# Patient Record
Sex: FEMALE | Race: WHITE | NOT HISPANIC OR LATINO | URBAN - METROPOLITAN AREA
[De-identification: names, ages, dates, MRNs, and addresses within clinical notes are randomized per-mention and may not be internally consistent; named-entity substitution may affect disease eponyms.]

---

## 2017-08-19 ENCOUNTER — EMERGENCY (EMERGENCY)
Facility: HOSPITAL | Age: 62
LOS: 1 days | Discharge: HOME | End: 2017-08-19
Attending: EMERGENCY MEDICINE
Payer: COMMERCIAL

## 2017-08-19 VITALS
OXYGEN SATURATION: 99 % | WEIGHT: 153 LBS | RESPIRATION RATE: 18 BRPM | HEIGHT: 60 IN | TEMPERATURE: 98 F | SYSTOLIC BLOOD PRESSURE: 147 MMHG | HEART RATE: 85 BPM | DIASTOLIC BLOOD PRESSURE: 82 MMHG

## 2017-08-19 PROCEDURE — 23650 CLTX SHO DSLC W/MNPJ WO ANES: CPT | Mod: 54

## 2017-08-19 PROCEDURE — 99283 EMERGENCY DEPT VISIT LOW MDM: CPT | Mod: 25

## 2017-08-19 PROCEDURE — 73030 X-RAY EXAM OF SHOULDER: CPT | Mod: 26,RT

## 2017-08-19 PROCEDURE — 23650 CLTX SHO DSLC W/MNPJ WO ANES: CPT | Mod: RT

## 2017-08-19 PROCEDURE — 73030 X-RAY EXAM OF SHOULDER: CPT

## 2017-08-19 RX ORDER — IBUPROFEN 200 MG
1 TABLET ORAL
Qty: 15 | Refills: 0 | OUTPATIENT
Start: 2017-08-19 | End: 2017-08-24

## 2017-08-19 RX ORDER — OXYCODONE AND ACETAMINOPHEN 5; 325 MG/1; MG/1
1 TABLET ORAL ONCE
Qty: 0 | Refills: 0 | Status: DISCONTINUED | OUTPATIENT
Start: 2017-08-19 | End: 2017-08-19

## 2017-08-19 RX ADMIN — OXYCODONE AND ACETAMINOPHEN 1 TABLET(S): 5; 325 TABLET ORAL at 19:56

## 2017-08-19 NOTE — ED ADULT TRIAGE NOTE - CHIEF COMPLAINT QUOTE
States fell off step stool and may have broke right shoulder. +2 radial pulses present. No pallor or pain to hand/fingers.

## 2017-08-19 NOTE — ED STATDOCS - CARE PLAN
Principal Discharge DX:	Shoulder dislocation, right, initial encounter  Secondary Diagnosis:	Fracture of humeral head

## 2017-08-19 NOTE — ED STATDOCS - NEUROLOGICAL, MLM
sensation is normal and strength is normal. sensation is normal and strength is normal. Neurovascularly intact.

## 2017-08-19 NOTE — ED ADULT NURSE NOTE - OBJECTIVE STATEMENT
Pt c/o r shoulder immobilization and pain that travels down the r arm causing a tingling sensation. Radial pulse is palpable. Pt states she fell backwards off a stool and her arm got caught behind her when she hit the ground. Pt denies hitting her head, no obvious deformity is noted. Obvious ROM is limited to the right upper extremity.

## 2017-08-19 NOTE — ED STATDOCS - PROGRESS NOTE DETAILS
NP NOTE:  61 y/o F slipped and fell off of a stool onto the floor.  She denies hitting her head or LOC.  Right shoulder deformed, x-ray with dislocation, reduced using scapular manipulation, sling applied.  post reduction x-ray in good position, + humeral head fx.  Will d/c home with rx ibuprofen and f/u ortho.

## 2017-08-19 NOTE — ED STATDOCS - ATTENDING CONTRIBUTION TO CARE
I, Kayla Lea, performed the initial face to face bedside interview with this patient regarding history of present illness, review of symptoms and relevant past medical, social and family history.  I completed an independent physical examination.  I was the initial provider who evaluated this patient. I have signed out the follow up of any pending tests (i.e. labs, radiological studies) to the ACP.  I have communicated the patient’s plan of care and disposition with the ACP.

## 2017-08-22 PROBLEM — Z00.00 ENCOUNTER FOR PREVENTIVE HEALTH EXAMINATION: Status: ACTIVE | Noted: 2017-08-22

## 2017-08-24 ENCOUNTER — APPOINTMENT (OUTPATIENT)
Dept: ORTHOPEDIC SURGERY | Facility: CLINIC | Age: 62
End: 2017-08-24
Payer: COMMERCIAL

## 2017-08-24 ENCOUNTER — OTHER (OUTPATIENT)
Age: 62
End: 2017-08-24

## 2017-08-24 VITALS
DIASTOLIC BLOOD PRESSURE: 84 MMHG | SYSTOLIC BLOOD PRESSURE: 148 MMHG | HEIGHT: 60 IN | BODY MASS INDEX: 29.45 KG/M2 | WEIGHT: 150 LBS | HEART RATE: 61 BPM

## 2017-08-24 DIAGNOSIS — Z87.891 PERSONAL HISTORY OF NICOTINE DEPENDENCE: ICD-10-CM

## 2017-08-24 DIAGNOSIS — Z86.79 PERSONAL HISTORY OF OTHER DISEASES OF THE CIRCULATORY SYSTEM: ICD-10-CM

## 2017-08-24 DIAGNOSIS — Z78.9 OTHER SPECIFIED HEALTH STATUS: ICD-10-CM

## 2017-08-24 DIAGNOSIS — Z87.39 PERSONAL HISTORY OF OTHER DISEASES OF THE MUSCULOSKELETAL SYSTEM AND CONNECTIVE TISSUE: ICD-10-CM

## 2017-08-24 DIAGNOSIS — Z86.39 PERSONAL HISTORY OF OTHER ENDOCRINE, NUTRITIONAL AND METABOLIC DISEASE: ICD-10-CM

## 2017-08-24 DIAGNOSIS — S42.291A OTHER DISPLACED FRACTURE OF UPPER END OF RIGHT HUMERUS, INITIAL ENCOUNTER FOR CLOSED FRACTURE: ICD-10-CM

## 2017-08-24 PROCEDURE — 99203 OFFICE O/P NEW LOW 30 MIN: CPT

## 2017-08-24 RX ORDER — TRAMADOL HYDROCHLORIDE 50 MG/1
50 TABLET, COATED ORAL
Qty: 30 | Refills: 0 | Status: ACTIVE | COMMUNITY
Start: 2017-08-24 | End: 1900-01-01

## 2017-08-24 RX ORDER — HYDROCHLOROTHIAZIDE 25 MG/1
25 TABLET ORAL
Qty: 90 | Refills: 0 | Status: ACTIVE | COMMUNITY
Start: 2017-06-21

## 2017-08-24 RX ORDER — ROSUVASTATIN CALCIUM 5 MG/1
5 TABLET, FILM COATED ORAL
Qty: 90 | Refills: 0 | Status: ACTIVE | COMMUNITY
Start: 2017-03-13

## 2017-08-24 RX ORDER — IBUPROFEN 600 MG/1
600 TABLET, FILM COATED ORAL
Qty: 15 | Refills: 0 | Status: ACTIVE | COMMUNITY
Start: 2017-08-20

## 2017-08-24 RX ORDER — LEVOTHYROXINE SODIUM 0.05 MG/1
50 TABLET ORAL
Qty: 90 | Refills: 0 | Status: ACTIVE | COMMUNITY
Start: 2017-06-21

## 2017-08-24 RX ORDER — QUINAPRIL HYDROCHLORIDE 20 MG/1
20 TABLET, FILM COATED ORAL
Qty: 90 | Refills: 0 | Status: ACTIVE | COMMUNITY
Start: 2017-08-11

## 2017-08-24 RX ORDER — ROSUVASTATIN CALCIUM 5 MG/1
5 TABLET, FILM COATED ORAL
Refills: 0 | Status: ACTIVE | COMMUNITY

## 2017-08-25 ENCOUNTER — OTHER (OUTPATIENT)
Age: 62
End: 2017-08-25

## 2017-08-26 ENCOUNTER — TRANSCRIPTION ENCOUNTER (OUTPATIENT)
Age: 62
End: 2017-08-26

## 2017-09-13 ENCOUNTER — APPOINTMENT (OUTPATIENT)
Dept: ORTHOPEDIC SURGERY | Facility: CLINIC | Age: 62
End: 2017-09-13
Payer: COMMERCIAL

## 2017-09-13 VITALS
HEIGHT: 60 IN | DIASTOLIC BLOOD PRESSURE: 83 MMHG | HEART RATE: 70 BPM | BODY MASS INDEX: 29.45 KG/M2 | SYSTOLIC BLOOD PRESSURE: 133 MMHG | WEIGHT: 150 LBS

## 2017-09-13 PROCEDURE — 73030 X-RAY EXAM OF SHOULDER: CPT | Mod: RT

## 2017-09-13 PROCEDURE — 99214 OFFICE O/P EST MOD 30 MIN: CPT

## 2017-09-13 RX ORDER — CYCLOBENZAPRINE HYDROCHLORIDE 10 MG/1
10 TABLET, FILM COATED ORAL
Qty: 60 | Refills: 2 | Status: ACTIVE | COMMUNITY
Start: 2017-09-13 | End: 1900-01-01

## 2017-10-17 ENCOUNTER — APPOINTMENT (OUTPATIENT)
Dept: ORTHOPEDIC SURGERY | Facility: CLINIC | Age: 62
End: 2017-10-17
Payer: COMMERCIAL

## 2017-10-17 VITALS
HEART RATE: 89 BPM | BODY MASS INDEX: 29.45 KG/M2 | WEIGHT: 150 LBS | DIASTOLIC BLOOD PRESSURE: 78 MMHG | HEIGHT: 60 IN | SYSTOLIC BLOOD PRESSURE: 124 MMHG

## 2017-10-17 PROCEDURE — 73030 X-RAY EXAM OF SHOULDER: CPT | Mod: RT

## 2017-10-17 PROCEDURE — 23600 CLTX PROX HUMRL FX W/O MNPJ: CPT | Mod: RT

## 2017-10-17 PROCEDURE — 99214 OFFICE O/P EST MOD 30 MIN: CPT | Mod: 57

## 2017-11-22 ENCOUNTER — OTHER (OUTPATIENT)
Age: 62
End: 2017-11-22

## 2017-11-24 ENCOUNTER — APPOINTMENT (OUTPATIENT)
Dept: ORTHOPEDIC SURGERY | Facility: CLINIC | Age: 62
End: 2017-11-24
Payer: COMMERCIAL

## 2017-11-24 VITALS
HEART RATE: 75 BPM | WEIGHT: 150 LBS | HEIGHT: 60 IN | SYSTOLIC BLOOD PRESSURE: 115 MMHG | BODY MASS INDEX: 29.45 KG/M2 | DIASTOLIC BLOOD PRESSURE: 77 MMHG | TEMPERATURE: 98.2 F

## 2017-11-24 DIAGNOSIS — M75.81 OTHER SHOULDER LESIONS, RIGHT SHOULDER: ICD-10-CM

## 2017-11-24 DIAGNOSIS — S42.291D OTHER DISPLACED FRACTURE OF UPPER END OF RIGHT HUMERUS, SUBSEQUENT ENCOUNTER FOR FRACTURE WITH ROUTINE HEALING: ICD-10-CM

## 2017-11-24 PROCEDURE — 99024 POSTOP FOLLOW-UP VISIT: CPT

## 2017-11-24 PROCEDURE — 73030 X-RAY EXAM OF SHOULDER: CPT | Mod: RT

## 2017-12-13 ENCOUNTER — OTHER (OUTPATIENT)
Age: 62
End: 2017-12-13

## 2017-12-13 DIAGNOSIS — M75.121 COMPLETE ROTATOR CUFF TEAR OR RUPTURE OF RIGHT SHOULDER, NOT SPECIFIED AS TRAUMATIC: ICD-10-CM

## 2017-12-28 ENCOUNTER — OTHER (OUTPATIENT)
Age: 62
End: 2017-12-28

## 2018-01-08 ENCOUNTER — APPOINTMENT (OUTPATIENT)
Dept: ORTHOPEDIC SURGERY | Facility: CLINIC | Age: 63
End: 2018-01-08
Payer: COMMERCIAL

## 2018-01-08 VITALS
BODY MASS INDEX: 29.45 KG/M2 | DIASTOLIC BLOOD PRESSURE: 85 MMHG | WEIGHT: 150 LBS | HEIGHT: 60 IN | HEART RATE: 69 BPM | SYSTOLIC BLOOD PRESSURE: 145 MMHG

## 2018-01-08 PROCEDURE — 99024 POSTOP FOLLOW-UP VISIT: CPT

## 2018-02-23 ENCOUNTER — OUTPATIENT (OUTPATIENT)
Dept: OUTPATIENT SERVICES | Facility: HOSPITAL | Age: 63
LOS: 1 days | End: 2018-02-23
Payer: COMMERCIAL

## 2018-02-23 VITALS
RESPIRATION RATE: 16 BRPM | OXYGEN SATURATION: 99 % | WEIGHT: 156.09 LBS | HEART RATE: 62 BPM | SYSTOLIC BLOOD PRESSURE: 136 MMHG | TEMPERATURE: 98 F | HEIGHT: 58.5 IN | DIASTOLIC BLOOD PRESSURE: 78 MMHG

## 2018-02-23 DIAGNOSIS — S46.811A STRAIN OF OTHER MUSCLES, FASCIA AND TENDONS AT SHOULDER AND UPPER ARM LEVEL, RIGHT ARM, INITIAL ENCOUNTER: ICD-10-CM

## 2018-02-23 DIAGNOSIS — Z98.891 HISTORY OF UTERINE SCAR FROM PREVIOUS SURGERY: Chronic | ICD-10-CM

## 2018-02-23 DIAGNOSIS — E03.9 HYPOTHYROIDISM, UNSPECIFIED: ICD-10-CM

## 2018-02-23 DIAGNOSIS — I10 ESSENTIAL (PRIMARY) HYPERTENSION: ICD-10-CM

## 2018-02-23 LAB
BUN SERPL-MCNC: 29 MG/DL — HIGH (ref 7–23)
CALCIUM SERPL-MCNC: 9.2 MG/DL — SIGNIFICANT CHANGE UP (ref 8.4–10.5)
CHLORIDE SERPL-SCNC: 98 MMOL/L — SIGNIFICANT CHANGE UP (ref 98–107)
CO2 SERPL-SCNC: 27 MMOL/L — SIGNIFICANT CHANGE UP (ref 22–31)
CREAT SERPL-MCNC: 1.22 MG/DL — SIGNIFICANT CHANGE UP (ref 0.5–1.3)
GLUCOSE SERPL-MCNC: 88 MG/DL — SIGNIFICANT CHANGE UP (ref 70–99)
HCT VFR BLD CALC: 39.5 % — SIGNIFICANT CHANGE UP (ref 34.5–45)
HGB BLD-MCNC: 12.8 G/DL — SIGNIFICANT CHANGE UP (ref 11.5–15.5)
MCHC RBC-ENTMCNC: 29.4 PG — SIGNIFICANT CHANGE UP (ref 27–34)
MCHC RBC-ENTMCNC: 32.4 % — SIGNIFICANT CHANGE UP (ref 32–36)
MCV RBC AUTO: 90.8 FL — SIGNIFICANT CHANGE UP (ref 80–100)
NRBC # FLD: 0 — SIGNIFICANT CHANGE UP
PLATELET # BLD AUTO: 234 K/UL — SIGNIFICANT CHANGE UP (ref 150–400)
PMV BLD: 11.8 FL — SIGNIFICANT CHANGE UP (ref 7–13)
POTASSIUM SERPL-MCNC: 4 MMOL/L — SIGNIFICANT CHANGE UP (ref 3.5–5.3)
POTASSIUM SERPL-SCNC: 4 MMOL/L — SIGNIFICANT CHANGE UP (ref 3.5–5.3)
RBC # BLD: 4.35 M/UL — SIGNIFICANT CHANGE UP (ref 3.8–5.2)
RBC # FLD: 12.2 % — SIGNIFICANT CHANGE UP (ref 10.3–14.5)
SODIUM SERPL-SCNC: 140 MMOL/L — SIGNIFICANT CHANGE UP (ref 135–145)
WBC # BLD: 8.82 K/UL — SIGNIFICANT CHANGE UP (ref 3.8–10.5)
WBC # FLD AUTO: 8.82 K/UL — SIGNIFICANT CHANGE UP (ref 3.8–10.5)

## 2018-02-23 PROCEDURE — 93010 ELECTROCARDIOGRAM REPORT: CPT

## 2018-02-23 RX ORDER — LEVOTHYROXINE SODIUM 125 MCG
1 TABLET ORAL
Qty: 0 | Refills: 0 | COMMUNITY

## 2018-02-23 RX ORDER — CHOLECALCIFEROL (VITAMIN D3) 125 MCG
1 CAPSULE ORAL
Qty: 0 | Refills: 0 | COMMUNITY

## 2018-02-23 RX ORDER — ASPIRIN/CALCIUM CARB/MAGNESIUM 324 MG
1 TABLET ORAL
Qty: 0 | Refills: 0 | COMMUNITY

## 2018-02-23 RX ORDER — ROSUVASTATIN CALCIUM 5 MG/1
5 TABLET ORAL
Qty: 0 | Refills: 0 | COMMUNITY

## 2018-02-23 RX ORDER — QUINAPRIL HYDROCHLORIDE 40 MG/1
1 TABLET, FILM COATED ORAL
Qty: 0 | Refills: 0 | COMMUNITY

## 2018-02-23 NOTE — H&P PST ADULT - LYMPHATIC
anterior cervical R/anterior cervical L/posterior cervical L/supraclavicular L/supraclavicular R/posterior cervical R

## 2018-02-23 NOTE — H&P PST ADULT - GASTROINTESTINAL DETAILS
no rebound tenderness/no rigidity/no organomegaly/no guarding/no distention/bowel sounds normal/no masses palpable/soft/no bruit/nontender

## 2018-02-23 NOTE — H&P PST ADULT - RS GEN PE MLT RESP DETAILS PC
breath sounds equal/no wheezes/clear to auscultation bilaterally/respirations non-labored/no rhonchi/no rales

## 2018-02-23 NOTE — H&P PST ADULT - HISTORY OF PRESENT ILLNESS
62 yr old female with medical hx htn, hyperlipidemia, hypothyroidism presents for preop evaluation with c/o right shoulder pain after sustaining a fall August 2017.  Pt reports she had rehab but continued to have pain and then did a "MRI which showed a tear". 62 yr old female with medical hx htn, hyperlipidemia, hypothyroidism presents for preop evaluation with c/o right shoulder pain after sustaining a fall August 2017.  Pt reports she had rehab but continued to have pain and then did a "MRI which showed a tear".   Pt was dx with Strain of other Muscle, fascia and tendons at shoulder and upper arm level and is now scheduled for Right Shoulder Rotator Cuff Repair Decompression on 03/06/18.

## 2018-02-23 NOTE — H&P PST ADULT - FAMILY HISTORY
Mother  Still living? No  Family history of Alzheimer's disease, Age at diagnosis: Age Unknown     Sibling  Still living? Yes, Estimated age: Age Unknown  Family history of breast cancer in sister, Age at diagnosis: Age Unknown

## 2018-02-23 NOTE — H&P PST ADULT - PROBLEM SELECTOR PLAN 1
Scheduled for Right Shoulder Rotator Cuff Repair Decompression on 03/06/18.  Labs and ekg done results pending.  Preop, famotidine and chlorhexidine instructions provided and questions addressed.

## 2018-03-06 ENCOUNTER — TRANSCRIPTION ENCOUNTER (OUTPATIENT)
Age: 63
End: 2018-03-06

## 2018-03-06 ENCOUNTER — APPOINTMENT (OUTPATIENT)
Dept: ORTHOPEDIC SURGERY | Facility: AMBULATORY SURGERY CENTER | Age: 63
End: 2018-03-06

## 2018-03-06 ENCOUNTER — OUTPATIENT (OUTPATIENT)
Dept: OUTPATIENT SERVICES | Facility: HOSPITAL | Age: 63
LOS: 1 days | Discharge: HOME | End: 2018-03-06
Payer: COMMERCIAL

## 2018-03-06 VITALS
RESPIRATION RATE: 18 BRPM | DIASTOLIC BLOOD PRESSURE: 64 MMHG | OXYGEN SATURATION: 97 % | SYSTOLIC BLOOD PRESSURE: 125 MMHG | HEART RATE: 93 BPM | TEMPERATURE: 98 F

## 2018-03-06 VITALS
WEIGHT: 156.09 LBS | RESPIRATION RATE: 16 BRPM | OXYGEN SATURATION: 99 % | DIASTOLIC BLOOD PRESSURE: 82 MMHG | TEMPERATURE: 98 F | HEART RATE: 67 BPM | SYSTOLIC BLOOD PRESSURE: 149 MMHG | HEIGHT: 58.5 IN

## 2018-03-06 DIAGNOSIS — Z98.891 HISTORY OF UTERINE SCAR FROM PREVIOUS SURGERY: Chronic | ICD-10-CM

## 2018-03-06 DIAGNOSIS — S46.811A STRAIN OF OTHER MUSCLES, FASCIA AND TENDONS AT SHOULDER AND UPPER ARM LEVEL, RIGHT ARM, INITIAL ENCOUNTER: ICD-10-CM

## 2018-03-06 PROCEDURE — 29827 SHO ARTHRS SRG RT8TR CUF RPR: CPT | Mod: RT

## 2018-03-06 PROCEDURE — 29823 SHO ARTHRS SRG XTNSV DBRDMT: CPT | Mod: RT

## 2018-03-06 PROCEDURE — 29826 SHO ARTHRS SRG DECOMPRESSION: CPT | Mod: RT

## 2018-03-06 NOTE — ASU DISCHARGE PLAN (ADULT/PEDIATRIC). - SPECIAL INSTRUCTIONS
Take stool softener as needed for possible constipation from pain meds     Don't take Tylenol with percocet as there is Tylenol in percocet     Ice 15-20 minutes every 3-4 hrs for next 3 days

## 2018-03-06 NOTE — ASU DISCHARGE PLAN (ADULT/PEDIATRIC). - NOTIFY
Fever greater than 101/Inability to Tolerate Liquids or Foods/Bleeding that does not stop/Swelling that continues/Pain not relieved by Medications/Unable to Urinate/Numbness, tingling/Numbness, color, or temperature change to extremity/Persistent Nausea and Vomiting

## 2018-03-19 ENCOUNTER — APPOINTMENT (OUTPATIENT)
Dept: ORTHOPEDIC SURGERY | Facility: CLINIC | Age: 63
End: 2018-03-19
Payer: COMMERCIAL

## 2018-03-19 VITALS — BODY MASS INDEX: 29.45 KG/M2 | WEIGHT: 150 LBS | HEIGHT: 60 IN

## 2018-03-19 PROCEDURE — 99024 POSTOP FOLLOW-UP VISIT: CPT

## 2018-04-09 ENCOUNTER — APPOINTMENT (OUTPATIENT)
Dept: ORTHOPEDIC SURGERY | Facility: CLINIC | Age: 63
End: 2018-04-09
Payer: COMMERCIAL

## 2018-04-09 DIAGNOSIS — S46.811D STRAIN OF OTHER MUSCLES, FASCIA AND TENDONS AT SHOULDER AND UPPER ARM LEVEL, RIGHT ARM, SUBSEQUENT ENCOUNTER: ICD-10-CM

## 2018-04-09 PROCEDURE — 99024 POSTOP FOLLOW-UP VISIT: CPT

## 2018-05-21 ENCOUNTER — APPOINTMENT (OUTPATIENT)
Dept: ORTHOPEDIC SURGERY | Facility: CLINIC | Age: 63
End: 2018-05-21
Payer: COMMERCIAL

## 2018-05-21 DIAGNOSIS — S46.219A STRAIN OF MUSCLE, FASCIA AND TENDON OF OTHER PARTS OF BICEPS, UNSPECIFIED ARM, INITIAL ENCOUNTER: ICD-10-CM

## 2018-05-21 PROCEDURE — 99024 POSTOP FOLLOW-UP VISIT: CPT

## 2018-07-02 ENCOUNTER — APPOINTMENT (OUTPATIENT)
Dept: ORTHOPEDIC SURGERY | Facility: CLINIC | Age: 63
End: 2018-07-02
Payer: COMMERCIAL

## 2018-07-02 PROCEDURE — 99213 OFFICE O/P EST LOW 20 MIN: CPT

## 2018-09-07 ENCOUNTER — OTHER (OUTPATIENT)
Age: 63
End: 2018-09-07

## 2018-09-24 ENCOUNTER — APPOINTMENT (OUTPATIENT)
Dept: ORTHOPEDIC SURGERY | Facility: CLINIC | Age: 63
End: 2018-09-24
Payer: COMMERCIAL

## 2018-09-24 VITALS
HEART RATE: 73 BPM | BODY MASS INDEX: 30.23 KG/M2 | SYSTOLIC BLOOD PRESSURE: 94 MMHG | DIASTOLIC BLOOD PRESSURE: 58 MMHG | WEIGHT: 154 LBS | HEIGHT: 60 IN

## 2018-09-24 PROCEDURE — 99213 OFFICE O/P EST LOW 20 MIN: CPT

## 2019-03-10 ENCOUNTER — TRANSCRIPTION ENCOUNTER (OUTPATIENT)
Age: 64
End: 2019-03-10

## 2019-03-11 PROBLEM — E03.9 HYPOTHYROIDISM, UNSPECIFIED: Chronic | Status: ACTIVE | Noted: 2018-02-23

## 2019-03-11 PROBLEM — S46.811A STRAIN OF OTHER MUSCLES, FASCIA AND TENDONS AT SHOULDER AND UPPER ARM LEVEL, RIGHT ARM, INITIAL ENCOUNTER: Chronic | Status: ACTIVE | Noted: 2018-02-23

## 2019-03-11 PROBLEM — E78.5 HYPERLIPIDEMIA, UNSPECIFIED: Chronic | Status: ACTIVE | Noted: 2017-08-19

## 2019-03-11 PROBLEM — I10 ESSENTIAL (PRIMARY) HYPERTENSION: Chronic | Status: ACTIVE | Noted: 2017-08-19

## 2019-04-01 ENCOUNTER — APPOINTMENT (OUTPATIENT)
Dept: ORTHOPEDIC SURGERY | Facility: CLINIC | Age: 64
End: 2019-04-01
Payer: COMMERCIAL

## 2019-04-01 DIAGNOSIS — M67.911 UNSPECIFIED DISORDER OF SYNOVIUM AND TENDON, RIGHT SHOULDER: ICD-10-CM

## 2019-04-01 PROCEDURE — 99213 OFFICE O/P EST LOW 20 MIN: CPT

## 2020-12-20 NOTE — ED STATDOCS - MUSCULOSKELETAL, MLM
Encourage oral intake.  Continue supplementation.   Assist with feedings. Tenderness over entire right shoulder with a step off Tenderness over entire right shoulder with palpable step off

## 2022-07-11 NOTE — ED ADULT NURSE NOTE - FALL HARM RISK TYPE OF ASSESSMENT
Daily Assessment Trilobed Flap Text: The defect edges were debeveled with a #15 scalpel blade.  Given the location of the defect and the proximity to free margins a trilobed flap was deemed most appropriate.  Using a sterile surgical marker, an appropriate trilobed flap drawn around the defect.    The area thus outlined was incised deep to adipose tissue with a #15 scalpel blade.  The skin margins were undermined to an appropriate distance in all directions utilizing iris scissors.

## 2023-05-02 ENCOUNTER — OFFICE (OUTPATIENT)
Dept: URBAN - METROPOLITAN AREA CLINIC 115 | Facility: CLINIC | Age: 68
Setting detail: OPHTHALMOLOGY
End: 2023-05-02
Payer: COMMERCIAL

## 2023-05-02 ENCOUNTER — RX ONLY (RX ONLY)
Age: 68
End: 2023-05-02

## 2023-05-02 DIAGNOSIS — H35.362: ICD-10-CM

## 2023-05-02 DIAGNOSIS — H01.001: ICD-10-CM

## 2023-05-02 DIAGNOSIS — H25.13: ICD-10-CM

## 2023-05-02 DIAGNOSIS — H35.361: ICD-10-CM

## 2023-05-02 DIAGNOSIS — H01.005: ICD-10-CM

## 2023-05-02 DIAGNOSIS — H01.004: ICD-10-CM

## 2023-05-02 DIAGNOSIS — H35.363: ICD-10-CM

## 2023-05-02 DIAGNOSIS — H01.002: ICD-10-CM

## 2023-05-02 PROCEDURE — 92004 COMPRE OPH EXAM NEW PT 1/>: CPT | Performed by: OPHTHALMOLOGY

## 2023-05-02 PROCEDURE — 92202 OPSCPY EXTND ON/MAC DRAW: CPT | Performed by: OPHTHALMOLOGY

## 2023-05-02 PROCEDURE — 92134 CPTRZ OPH DX IMG PST SGM RTA: CPT | Performed by: OPHTHALMOLOGY

## 2023-05-02 ASSESSMENT — TONOMETRY
OD_IOP_MMHG: 11
OS_IOP_MMHG: 12

## 2023-05-02 ASSESSMENT — REFRACTION_CURRENTRX
OS_AXIS: 180
OS_SPHERE: -0.25
OS_CYLINDER: -0.75
OS_AXIS: 164
OD_VPRISM_DIRECTION: SV
OD_CYLINDER: -0.75
OD_AXIS: 180
OS_VPRISM_DIRECTION: SV
OD_CYLINDER: 0.00
OD_SPHERE: -0.50
OS_VPRISM_DIRECTION: SV
OS_OVR_VA: 20/
OD_VPRISM_DIRECTION: SV
OS_CYLINDER: 0.00
OS_OVR_VA: 20/
OD_AXIS: 022
OS_SPHERE: -0.25
OD_OVR_VA: 20/
OD_OVR_VA: 20/
OD_SPHERE: -2.00

## 2023-05-02 ASSESSMENT — LID EXAM ASSESSMENTS
OD_BLEPHARITIS: RLL RUL 1+
OS_BLEPHARITIS: LLL LUL 1+

## 2023-05-02 ASSESSMENT — CONFRONTATIONAL VISUAL FIELD TEST (CVF)
OS_FINDINGS: FULL
OD_FINDINGS: FULL

## 2023-05-02 ASSESSMENT — VISUAL ACUITY
OD_BCVA: 20/25-
OS_BCVA: 20/20

## 2023-05-02 ASSESSMENT — REFRACTION_AUTOREFRACTION
OS_CYLINDER: -0.75
OS_SPHERE: +0.25
OD_CYLINDER: -0.50
OS_AXIS: 162
OD_SPHERE: -0.75
OD_AXIS: 005

## 2023-05-02 ASSESSMENT — SPHEQUIV_DERIVED
OD_SPHEQUIV: -1
OS_SPHEQUIV: -0.125

## 2023-11-14 ENCOUNTER — OFFICE (OUTPATIENT)
Dept: URBAN - METROPOLITAN AREA CLINIC 115 | Facility: CLINIC | Age: 68
Setting detail: OPHTHALMOLOGY
End: 2023-11-14
Payer: COMMERCIAL

## 2023-11-14 DIAGNOSIS — H25.13: ICD-10-CM

## 2023-11-14 DIAGNOSIS — H01.004: ICD-10-CM

## 2023-11-14 DIAGNOSIS — H35.362: ICD-10-CM

## 2023-11-14 DIAGNOSIS — H35.361: ICD-10-CM

## 2023-11-14 DIAGNOSIS — H01.002: ICD-10-CM

## 2023-11-14 DIAGNOSIS — H01.001: ICD-10-CM

## 2023-11-14 DIAGNOSIS — H35.363: ICD-10-CM

## 2023-11-14 DIAGNOSIS — H01.005: ICD-10-CM

## 2023-11-14 PROCEDURE — 92134 CPTRZ OPH DX IMG PST SGM RTA: CPT | Performed by: OPHTHALMOLOGY

## 2023-11-14 PROCEDURE — 92014 COMPRE OPH EXAM EST PT 1/>: CPT | Performed by: OPHTHALMOLOGY

## 2023-11-14 ASSESSMENT — REFRACTION_CURRENTRX
OD_CYLINDER: 0.00
OS_CYLINDER: -0.75
OD_VPRISM_DIRECTION: SV
OD_OVR_VA: 20/
OS_SPHERE: -0.25
OD_SPHERE: -2.00
OD_AXIS: 180
OS_OVR_VA: 20/
OS_AXIS: 180
OD_CYLINDER: -0.75
OS_VPRISM_DIRECTION: SV
OS_VPRISM_DIRECTION: SV
OS_OVR_VA: 20/
OD_SPHERE: -0.50
OS_CYLINDER: 0.00
OD_AXIS: 022
OS_SPHERE: -0.25
OD_VPRISM_DIRECTION: SV
OD_OVR_VA: 20/
OS_AXIS: 164

## 2023-11-14 ASSESSMENT — CONFRONTATIONAL VISUAL FIELD TEST (CVF)
OD_FINDINGS: FULL
OS_FINDINGS: FULL

## 2023-11-14 ASSESSMENT — REFRACTION_AUTOREFRACTION
OD_CYLINDER: -0.25
OD_SPHERE: -1.00
OS_AXIS: 162
OD_AXIS: 174
OS_CYLINDER: -0.75
OS_SPHERE: +0.25

## 2023-11-14 ASSESSMENT — SPHEQUIV_DERIVED
OD_SPHEQUIV: -1.125
OS_SPHEQUIV: -0.125

## 2023-11-14 ASSESSMENT — LID EXAM ASSESSMENTS
OS_BLEPHARITIS: LLL LUL 1+
OD_BLEPHARITIS: RLL RUL 1+

## 2023-11-30 ENCOUNTER — OFFICE (OUTPATIENT)
Dept: URBAN - METROPOLITAN AREA CLINIC 115 | Facility: CLINIC | Age: 68
Setting detail: OPHTHALMOLOGY
End: 2023-11-30
Payer: COMMERCIAL

## 2023-11-30 DIAGNOSIS — H35.3112: ICD-10-CM

## 2023-11-30 DIAGNOSIS — H35.3221: ICD-10-CM

## 2023-11-30 PROCEDURE — 92134 CPTRZ OPH DX IMG PST SGM RTA: CPT | Performed by: OPHTHALMOLOGY

## 2023-11-30 PROCEDURE — 99214 OFFICE O/P EST MOD 30 MIN: CPT | Mod: 25 | Performed by: OPHTHALMOLOGY

## 2023-11-30 PROCEDURE — 67028 INJECTION EYE DRUG: CPT | Mod: LT | Performed by: OPHTHALMOLOGY

## 2023-11-30 ASSESSMENT — REFRACTION_CURRENTRX
OS_VPRISM_DIRECTION: SV
OS_SPHERE: -0.25
OD_CYLINDER: -0.75
OD_AXIS: 022
OS_VPRISM_DIRECTION: SV
OS_AXIS: 164
OS_CYLINDER: -0.75
OD_AXIS: 180
OS_CYLINDER: 0.00
OS_OVR_VA: 20/
OD_OVR_VA: 20/
OD_CYLINDER: 0.00
OS_OVR_VA: 20/
OD_SPHERE: -0.50
OD_OVR_VA: 20/
OS_SPHERE: -0.25
OD_VPRISM_DIRECTION: SV
OD_SPHERE: -2.00
OS_AXIS: 180
OD_VPRISM_DIRECTION: SV

## 2023-11-30 ASSESSMENT — REFRACTION_AUTOREFRACTION
OD_SPHERE: -1.00
OS_SPHERE: +0.25
OS_CYLINDER: -0.75
OD_AXIS: 174
OD_CYLINDER: -0.25
OS_AXIS: 162

## 2023-11-30 ASSESSMENT — SPHEQUIV_DERIVED
OD_SPHEQUIV: -1.125
OS_SPHEQUIV: -0.125

## 2024-01-18 ENCOUNTER — OFFICE (OUTPATIENT)
Dept: URBAN - METROPOLITAN AREA CLINIC 115 | Facility: CLINIC | Age: 69
Setting detail: OPHTHALMOLOGY
End: 2024-01-18
Payer: MEDICARE

## 2024-01-18 DIAGNOSIS — H35.3221: ICD-10-CM

## 2024-01-18 DIAGNOSIS — H35.3112: ICD-10-CM

## 2024-01-18 PROCEDURE — 92134 CPTRZ OPH DX IMG PST SGM RTA: CPT | Performed by: OPHTHALMOLOGY

## 2024-01-18 PROCEDURE — 67028 INJECTION EYE DRUG: CPT | Mod: LT | Performed by: OPHTHALMOLOGY

## 2024-01-18 ASSESSMENT — REFRACTION_CURRENTRX
OS_AXIS: 164
OD_AXIS: 180
OS_CYLINDER: 0.00
OS_VPRISM_DIRECTION: SV
OD_AXIS: 022
OD_VPRISM_DIRECTION: SV
OD_OVR_VA: 20/
OS_AXIS: 180
OD_VPRISM_DIRECTION: SV
OD_SPHERE: -0.50
OS_OVR_VA: 20/
OS_OVR_VA: 20/
OD_OVR_VA: 20/
OS_VPRISM_DIRECTION: SV
OD_CYLINDER: -0.75
OD_SPHERE: -2.00
OS_SPHERE: -0.25
OD_CYLINDER: 0.00
OS_SPHERE: -0.25
OS_CYLINDER: -0.75

## 2024-01-18 ASSESSMENT — LID EXAM ASSESSMENTS
OS_BLEPHARITIS: LLL LUL 1+
OD_BLEPHARITIS: RLL RUL 1+

## 2024-01-18 ASSESSMENT — REFRACTION_AUTOREFRACTION
OD_CYLINDER: -0.25
OD_SPHERE: -1.00
OS_AXIS: 162
OS_SPHERE: +0.25
OS_CYLINDER: -0.75
OD_AXIS: 174

## 2024-01-18 ASSESSMENT — SPHEQUIV_DERIVED
OS_SPHEQUIV: -0.125
OD_SPHEQUIV: -1.125

## 2024-01-18 ASSESSMENT — CONFRONTATIONAL VISUAL FIELD TEST (CVF)
OS_FINDINGS: FULL
OD_FINDINGS: FULL

## 2024-02-19 ENCOUNTER — OFFICE (OUTPATIENT)
Dept: URBAN - METROPOLITAN AREA CLINIC 115 | Facility: CLINIC | Age: 69
Setting detail: OPHTHALMOLOGY
End: 2024-02-19
Payer: MEDICARE

## 2024-02-19 DIAGNOSIS — H35.3221: ICD-10-CM

## 2024-02-19 DIAGNOSIS — H35.3112: ICD-10-CM

## 2024-02-19 PROCEDURE — 67028 INJECTION EYE DRUG: CPT | Mod: LT | Performed by: OPHTHALMOLOGY

## 2024-02-19 PROCEDURE — 92134 CPTRZ OPH DX IMG PST SGM RTA: CPT | Performed by: OPHTHALMOLOGY

## 2024-02-19 ASSESSMENT — REFRACTION_CURRENTRX
OD_OVR_VA: 20/
OD_AXIS: 180
OS_AXIS: 180
OD_AXIS: 022
OS_CYLINDER: -0.75
OD_CYLINDER: -0.75
OD_VPRISM_DIRECTION: SV
OD_SPHERE: -2.00
OS_AXIS: 164
OS_OVR_VA: 20/
OD_VPRISM_DIRECTION: SV
OS_CYLINDER: 0.00
OS_VPRISM_DIRECTION: SV
OS_VPRISM_DIRECTION: SV
OS_OVR_VA: 20/
OS_SPHERE: -0.25
OD_OVR_VA: 20/
OS_SPHERE: -0.25
OD_SPHERE: -0.50
OD_CYLINDER: 0.00

## 2024-02-19 ASSESSMENT — LID EXAM ASSESSMENTS
OD_BLEPHARITIS: RLL RUL 1+
OS_BLEPHARITIS: LLL LUL 1+

## 2024-02-19 ASSESSMENT — CONFRONTATIONAL VISUAL FIELD TEST (CVF)
OS_FINDINGS: FULL
OD_FINDINGS: FULL

## 2024-03-21 ENCOUNTER — OFFICE (OUTPATIENT)
Dept: URBAN - METROPOLITAN AREA CLINIC 115 | Facility: CLINIC | Age: 69
Setting detail: OPHTHALMOLOGY
End: 2024-03-21
Payer: MEDICARE

## 2024-03-21 DIAGNOSIS — H35.3221: ICD-10-CM

## 2024-03-21 DIAGNOSIS — H35.3112: ICD-10-CM

## 2024-03-21 PROCEDURE — 92134 CPTRZ OPH DX IMG PST SGM RTA: CPT | Performed by: OPHTHALMOLOGY

## 2024-03-21 PROCEDURE — 67028 INJECTION EYE DRUG: CPT | Mod: LT | Performed by: OPHTHALMOLOGY

## 2024-03-21 ASSESSMENT — REFRACTION_CURRENTRX
OS_VPRISM_DIRECTION: SV
OD_AXIS: 022
OD_CYLINDER: -0.75
OS_CYLINDER: 0.00
OS_AXIS: 180
OS_SPHERE: -0.25
OD_AXIS: 180
OS_OVR_VA: 20/
OS_SPHERE: -0.25
OS_OVR_VA: 20/
OD_SPHERE: -2.00
OS_AXIS: 164
OD_OVR_VA: 20/
OD_VPRISM_DIRECTION: SV
OD_OVR_VA: 20/
OS_VPRISM_DIRECTION: SV
OD_SPHERE: -0.50
OD_CYLINDER: 0.00
OD_VPRISM_DIRECTION: SV
OS_CYLINDER: -0.75

## 2024-03-21 ASSESSMENT — LID EXAM ASSESSMENTS
OS_BLEPHARITIS: LLL LUL 1+
OD_BLEPHARITIS: RLL RUL 1+

## 2024-04-25 ENCOUNTER — OFFICE (OUTPATIENT)
Dept: URBAN - METROPOLITAN AREA CLINIC 115 | Facility: CLINIC | Age: 69
Setting detail: OPHTHALMOLOGY
End: 2024-04-25
Payer: MEDICARE

## 2024-04-25 DIAGNOSIS — H35.3221: ICD-10-CM

## 2024-04-25 DIAGNOSIS — H35.3112: ICD-10-CM

## 2024-04-25 DIAGNOSIS — H25.13: ICD-10-CM

## 2024-04-25 PROCEDURE — 92014 COMPRE OPH EXAM EST PT 1/>: CPT | Mod: 25 | Performed by: OPHTHALMOLOGY

## 2024-04-25 PROCEDURE — 92202 OPSCPY EXTND ON/MAC DRAW: CPT | Performed by: OPHTHALMOLOGY

## 2024-04-25 PROCEDURE — 67028 INJECTION EYE DRUG: CPT | Mod: LT | Performed by: OPHTHALMOLOGY

## 2024-04-25 PROCEDURE — 92134 CPTRZ OPH DX IMG PST SGM RTA: CPT | Performed by: OPHTHALMOLOGY

## 2024-04-25 ASSESSMENT — LID EXAM ASSESSMENTS
OS_BLEPHARITIS: LLL LUL 1+
OD_BLEPHARITIS: RLL RUL 1+

## 2024-05-28 ENCOUNTER — OFFICE (OUTPATIENT)
Dept: URBAN - METROPOLITAN AREA CLINIC 115 | Facility: CLINIC | Age: 69
Setting detail: OPHTHALMOLOGY
End: 2024-05-28
Payer: MEDICARE

## 2024-05-28 DIAGNOSIS — H35.3221: ICD-10-CM

## 2024-05-28 DIAGNOSIS — H35.3112: ICD-10-CM

## 2024-05-28 PROCEDURE — 67028 INJECTION EYE DRUG: CPT | Mod: LT | Performed by: OPHTHALMOLOGY

## 2024-05-28 PROCEDURE — 92134 CPTRZ OPH DX IMG PST SGM RTA: CPT | Performed by: OPHTHALMOLOGY

## 2024-05-28 ASSESSMENT — LID EXAM ASSESSMENTS
OS_BLEPHARITIS: LLL LUL 1+
OD_BLEPHARITIS: RLL RUL 1+

## 2024-05-28 ASSESSMENT — CONFRONTATIONAL VISUAL FIELD TEST (CVF)
OS_FINDINGS: FULL
OD_FINDINGS: FULL

## 2024-07-23 ENCOUNTER — OFFICE (OUTPATIENT)
Dept: URBAN - METROPOLITAN AREA CLINIC 115 | Facility: CLINIC | Age: 69
Setting detail: OPHTHALMOLOGY
End: 2024-07-23
Payer: MEDICARE

## 2024-07-23 DIAGNOSIS — H35.3221: ICD-10-CM

## 2024-07-23 DIAGNOSIS — H35.3112: ICD-10-CM

## 2024-07-23 PROCEDURE — 92134 CPTRZ OPH DX IMG PST SGM RTA: CPT | Performed by: OPHTHALMOLOGY

## 2024-07-23 PROCEDURE — 67028 INJECTION EYE DRUG: CPT | Mod: LT | Performed by: OPHTHALMOLOGY

## 2024-07-23 ASSESSMENT — CONFRONTATIONAL VISUAL FIELD TEST (CVF)
OS_FINDINGS: FULL
OD_FINDINGS: FULL

## 2024-07-23 ASSESSMENT — LID EXAM ASSESSMENTS
OD_BLEPHARITIS: RLL RUL 1+
OS_BLEPHARITIS: LLL LUL 1+

## 2024-08-04 ENCOUNTER — EMERGENCY (EMERGENCY)
Facility: HOSPITAL | Age: 69
LOS: 1 days | Discharge: HOME | End: 2024-08-04
Attending: EMERGENCY MEDICINE
Payer: MEDICARE

## 2024-08-04 VITALS
SYSTOLIC BLOOD PRESSURE: 192 MMHG | OXYGEN SATURATION: 99 % | HEART RATE: 78 BPM | DIASTOLIC BLOOD PRESSURE: 95 MMHG | WEIGHT: 133.16 LBS | RESPIRATION RATE: 18 BRPM | TEMPERATURE: 98 F

## 2024-08-04 VITALS
SYSTOLIC BLOOD PRESSURE: 159 MMHG | RESPIRATION RATE: 16 BRPM | TEMPERATURE: 98 F | OXYGEN SATURATION: 99 % | DIASTOLIC BLOOD PRESSURE: 82 MMHG | HEART RATE: 76 BPM

## 2024-08-04 DIAGNOSIS — Z98.891 HISTORY OF UTERINE SCAR FROM PREVIOUS SURGERY: Chronic | ICD-10-CM

## 2024-08-04 PROCEDURE — 99284 EMERGENCY DEPT VISIT MOD MDM: CPT

## 2024-08-04 PROCEDURE — 99283 EMERGENCY DEPT VISIT LOW MDM: CPT | Mod: 25

## 2024-08-04 PROCEDURE — 93005 ELECTROCARDIOGRAM TRACING: CPT

## 2024-08-04 PROCEDURE — 93010 ELECTROCARDIOGRAM REPORT: CPT

## 2024-08-04 RX ORDER — ENALAPRIL MALEATE 20 MG
10 TABLET ORAL ONCE
Refills: 0 | Status: COMPLETED | OUTPATIENT
Start: 2024-08-04 | End: 2024-08-04

## 2024-08-04 RX ORDER — ENALAPRIL MALEATE 20 MG
1 TABLET ORAL
Qty: 30 | Refills: 0
Start: 2024-08-04 | End: 2024-09-02

## 2024-08-04 RX ADMIN — Medication 10 MILLIGRAM(S): at 06:11

## 2024-08-07 DIAGNOSIS — I10 ESSENTIAL (PRIMARY) HYPERTENSION: ICD-10-CM

## 2024-08-07 DIAGNOSIS — E03.9 HYPOTHYROIDISM, UNSPECIFIED: ICD-10-CM

## 2024-09-26 ENCOUNTER — OFFICE (OUTPATIENT)
Dept: URBAN - METROPOLITAN AREA CLINIC 115 | Facility: CLINIC | Age: 69
Setting detail: OPHTHALMOLOGY
End: 2024-09-26
Payer: MEDICARE

## 2024-09-26 DIAGNOSIS — H35.3112: ICD-10-CM

## 2024-09-26 DIAGNOSIS — H35.3221: ICD-10-CM

## 2024-09-26 PROCEDURE — 67028 INJECTION EYE DRUG: CPT | Mod: LT | Performed by: OPHTHALMOLOGY

## 2024-09-26 PROCEDURE — 92134 CPTRZ OPH DX IMG PST SGM RTA: CPT | Performed by: OPHTHALMOLOGY

## 2024-09-26 PROCEDURE — 92012 INTRM OPH EXAM EST PATIENT: CPT | Mod: 25 | Performed by: OPHTHALMOLOGY

## 2024-09-26 ASSESSMENT — CONFRONTATIONAL VISUAL FIELD TEST (CVF)
OS_FINDINGS: FULL
OD_FINDINGS: FULL

## 2024-09-26 ASSESSMENT — LID EXAM ASSESSMENTS
OS_BLEPHARITIS: LLL LUL 1+
OD_BLEPHARITIS: RLL RUL 1+

## 2024-12-11 NOTE — ED STATDOCS - OBJECTIVE STATEMENT
Reynolds.   61 y/o F pt with PMHx of HTN presents to ED c/o right shoulder pain s/p mechanical fall. Pt reports she fell off a step stool, landing on her shoulder. She states "I heard a pop and can't feel my shoulder." Pt denies LOC, head injury, CP, SOB, nausea, vomiting, diarrhea, abd pain, dysuria, hematuria, headache, neck pain, back pain, and dizziness. No further complaints at this time. NKDA.

## 2024-12-12 ENCOUNTER — OFFICE (OUTPATIENT)
Dept: URBAN - METROPOLITAN AREA CLINIC 115 | Facility: CLINIC | Age: 69
Setting detail: OPHTHALMOLOGY
End: 2024-12-12
Payer: MEDICARE

## 2024-12-12 DIAGNOSIS — H35.3112: ICD-10-CM

## 2024-12-12 DIAGNOSIS — H35.3221: ICD-10-CM

## 2024-12-12 PROCEDURE — 67028 INJECTION EYE DRUG: CPT | Mod: LT | Performed by: OPHTHALMOLOGY

## 2024-12-12 PROCEDURE — 92134 CPTRZ OPH DX IMG PST SGM RTA: CPT | Performed by: OPHTHALMOLOGY

## 2024-12-12 ASSESSMENT — REFRACTION_CURRENTRX
OD_VPRISM_DIRECTION: SV
OD_CYLINDER: -0.75
OS_CYLINDER: 0.00
OD_VPRISM_DIRECTION: SV
OS_AXIS: 180
OD_SPHERE: -2.00
OD_AXIS: 180
OD_AXIS: 022
OS_AXIS: 164
OD_SPHERE: -0.50
OS_OVR_VA: 20/
OS_OVR_VA: 20/
OS_VPRISM_DIRECTION: SV
OD_OVR_VA: 20/
OD_CYLINDER: 0.00
OS_CYLINDER: -0.75
OD_OVR_VA: 20/
OS_SPHERE: -0.25
OS_VPRISM_DIRECTION: SV
OS_SPHERE: -0.25

## 2024-12-12 ASSESSMENT — CONFRONTATIONAL VISUAL FIELD TEST (CVF)
OS_FINDINGS: FULL
OD_FINDINGS: FULL

## 2024-12-12 ASSESSMENT — TONOMETRY
OS_IOP_MMHG: 12
OD_IOP_MMHG: 10

## 2024-12-12 ASSESSMENT — VISUAL ACUITY
OD_BCVA: 20/40-
OS_BCVA: 20/25

## 2024-12-12 ASSESSMENT — LID EXAM ASSESSMENTS
OS_BLEPHARITIS: LLL LUL 1+
OD_BLEPHARITIS: RLL RUL 1+

## 2025-01-24 NOTE — ED ADULT NURSE NOTE - PSYCHOSOCIAL WDL
Pt aware of results and recommendations to continue home medication.
The [facility] called has questions regarding the lab order frequency. Caller gave the clinic number she will be going back to for a call back number.  
Alert and oriented x 3, normal mood and affect, no apparent risk to self or others.

## 2025-03-11 ENCOUNTER — OFFICE (OUTPATIENT)
Dept: URBAN - METROPOLITAN AREA CLINIC 115 | Facility: CLINIC | Age: 70
Setting detail: OPHTHALMOLOGY
End: 2025-03-11
Payer: MEDICARE

## 2025-03-11 DIAGNOSIS — H35.3112: ICD-10-CM

## 2025-03-11 DIAGNOSIS — H35.3221: ICD-10-CM

## 2025-03-11 PROCEDURE — 92201 OPSCPY EXTND RTA DRAW UNI/BI: CPT | Performed by: OPHTHALMOLOGY

## 2025-03-11 PROCEDURE — 92134 CPTRZ OPH DX IMG PST SGM RTA: CPT | Performed by: OPHTHALMOLOGY

## 2025-03-11 PROCEDURE — 92014 COMPRE OPH EXAM EST PT 1/>: CPT | Mod: 25 | Performed by: OPHTHALMOLOGY

## 2025-03-11 PROCEDURE — 67028 INJECTION EYE DRUG: CPT | Mod: LT | Performed by: OPHTHALMOLOGY

## 2025-03-11 ASSESSMENT — REFRACTION_CURRENTRX
OD_OVR_VA: 20/
OD_CYLINDER: -0.75
OD_AXIS: 180
OS_SPHERE: -0.25
OD_VPRISM_DIRECTION: SV
OD_SPHERE: -0.50
OS_VPRISM_DIRECTION: SV
OD_SPHERE: -2.00
OD_AXIS: 022
OS_AXIS: 180
OS_CYLINDER: -0.75
OS_CYLINDER: 0.00
OS_VPRISM_DIRECTION: SV
OD_VPRISM_DIRECTION: SV
OS_AXIS: 164
OS_SPHERE: -0.25
OS_OVR_VA: 20/
OD_CYLINDER: 0.00
OS_OVR_VA: 20/
OD_OVR_VA: 20/

## 2025-03-11 ASSESSMENT — LID EXAM ASSESSMENTS
OS_BLEPHARITIS: LLL LUL 1+
OD_BLEPHARITIS: RLL RUL 1+

## 2025-03-11 ASSESSMENT — CONFRONTATIONAL VISUAL FIELD TEST (CVF)
OS_FINDINGS: FULL
OD_FINDINGS: FULL

## 2025-03-11 ASSESSMENT — TONOMETRY
OS_IOP_MMHG: 15
OD_IOP_MMHG: 14

## 2025-03-11 ASSESSMENT — VISUAL ACUITY
OS_BCVA: 20/30-
OD_BCVA: 20/40-

## 2025-07-10 ENCOUNTER — OFFICE (OUTPATIENT)
Dept: URBAN - METROPOLITAN AREA CLINIC 115 | Facility: CLINIC | Age: 70
Setting detail: OPHTHALMOLOGY
End: 2025-07-10
Payer: MEDICARE

## 2025-07-10 DIAGNOSIS — H35.3112: ICD-10-CM

## 2025-07-10 DIAGNOSIS — H35.3221: ICD-10-CM

## 2025-07-10 PROCEDURE — 92134 CPTRZ OPH DX IMG PST SGM RTA: CPT | Performed by: OPHTHALMOLOGY

## 2025-07-10 PROCEDURE — 67028 INJECTION EYE DRUG: CPT | Mod: LT | Performed by: OPHTHALMOLOGY

## 2025-07-10 ASSESSMENT — REFRACTION_CURRENTRX
OS_SPHERE: -0.25
OD_VPRISM_DIRECTION: SV
OS_VPRISM_DIRECTION: SV
OD_SPHERE: -0.50
OS_AXIS: 180
OS_VPRISM_DIRECTION: SV
OD_VPRISM_DIRECTION: SV
OD_AXIS: 180
OD_CYLINDER: -0.75
OD_CYLINDER: 0.00
OD_OVR_VA: 20/
OD_AXIS: 022
OS_OVR_VA: 20/
OD_SPHERE: -2.00
OS_AXIS: 164
OS_SPHERE: -0.25
OS_OVR_VA: 20/
OD_OVR_VA: 20/
OS_CYLINDER: -0.75
OS_CYLINDER: 0.00

## 2025-07-10 ASSESSMENT — TONOMETRY
OS_IOP_MMHG: 11
OD_IOP_MMHG: 10

## 2025-07-10 ASSESSMENT — VISUAL ACUITY
OS_BCVA: 20/25
OD_BCVA: 20/30